# Patient Record
Sex: FEMALE | Race: WHITE | NOT HISPANIC OR LATINO | Employment: FULL TIME | URBAN - METROPOLITAN AREA
[De-identification: names, ages, dates, MRNs, and addresses within clinical notes are randomized per-mention and may not be internally consistent; named-entity substitution may affect disease eponyms.]

---

## 2024-04-27 ENCOUNTER — HOSPITAL ENCOUNTER (EMERGENCY)
Facility: HOSPITAL | Age: 44
Discharge: HOME | End: 2024-04-27
Attending: EMERGENCY MEDICINE
Payer: MEDICAID

## 2024-04-27 VITALS
TEMPERATURE: 99 F | RESPIRATION RATE: 14 BRPM | HEIGHT: 60 IN | BODY MASS INDEX: 42.6 KG/M2 | WEIGHT: 217 LBS | OXYGEN SATURATION: 96 % | HEART RATE: 82 BPM | SYSTOLIC BLOOD PRESSURE: 142 MMHG | DIASTOLIC BLOOD PRESSURE: 89 MMHG

## 2024-04-27 DIAGNOSIS — R51.9 NONINTRACTABLE HEADACHE, UNSPECIFIED CHRONICITY PATTERN, UNSPECIFIED HEADACHE TYPE: ICD-10-CM

## 2024-04-27 DIAGNOSIS — J04.0 LARYNGITIS: ICD-10-CM

## 2024-04-27 DIAGNOSIS — R49.0 HOARSE VOICE QUALITY: Primary | ICD-10-CM

## 2024-04-27 PROCEDURE — 99282 EMERGENCY DEPT VISIT SF MDM: CPT

## 2024-04-27 PROCEDURE — 96372 THER/PROPH/DIAG INJ SC/IM: CPT

## 2024-04-27 PROCEDURE — 25010000002 KETOROLAC TROMETHAMINE PER 15 MG: Performed by: EMERGENCY MEDICINE

## 2024-04-27 RX ORDER — KETOROLAC TROMETHAMINE 30 MG/ML
30 INJECTION, SOLUTION INTRAMUSCULAR; INTRAVENOUS ONCE
Status: COMPLETED | OUTPATIENT
Start: 2024-04-27 | End: 2024-04-27

## 2024-04-27 RX ADMIN — KETOROLAC TROMETHAMINE 30 MG: 30 INJECTION, SOLUTION INTRAMUSCULAR; INTRAVENOUS at 14:09

## 2024-04-27 NOTE — ED PROVIDER NOTES
Subjective   History of Present Illness  Patient is a pleasant 44-year-old female presents to the emergency department with headache and hoarse voice time.  She states that she have a history of recurrent laryngitis and is concerned that there could be something more concerning in the throat such as a polyp or cancer.  She states that for the past a year she has had frequent hoarse voice.  She had it for 3-month duration last year and was referred to ENT at that time.  Symptoms improved and she had commitments that she was unable to attend the ENT appointment in February.  Symptoms again had resolved for a couple months at that point.  Symptoms returned more recently in the hoarse voice currently persist.  She presents today with a desire to visualize/evaluate her vocal cords.  Additionally, she says that she has a mild headache.  She has a history of migraines but says that for the past 2 weeks she has been taking Excedrin Migraine throughout the day with only minimal relief.  Rates the headache as a mild to moderate.  States it is not a severe headache and the onset was not abrupt.        Review of Systems   All other systems reviewed and are negative.      No past medical history on file.    No Known Allergies    No past surgical history on file.    No family history on file.    Social History     Socioeconomic History    Marital status:            Objective   Physical Exam  Vitals and nursing note reviewed.   Constitutional:       General: She is not in acute distress.  HENT:      Head: Normocephalic and atraumatic.   Eyes:      Conjunctiva/sclera: Conjunctivae normal.      Pupils: Pupils are equal, round, and reactive to light.   Neck:      Thyroid: No thyromegaly.   Cardiovascular:      Rate and Rhythm: Normal rate and regular rhythm.      Heart sounds: Normal heart sounds. No murmur heard.     No friction rub. No gallop.   Pulmonary:      Effort: Pulmonary effort is normal. No respiratory distress.     "  Breath sounds: Normal breath sounds.   Abdominal:      General: Bowel sounds are normal.      Palpations: Abdomen is soft.      Tenderness: There is no abdominal tenderness.   Musculoskeletal:         General: Normal range of motion.      Cervical back: Normal range of motion and neck supple.   Lymphadenopathy:      Cervical: No cervical adenopathy.   Skin:     General: Skin is warm and dry.   Neurological:      Mental Status: She is alert and oriented to person, place, and time.   Psychiatric:         Behavior: Behavior normal.         Procedures           ED Course          No orders to display     Vitals:    04/27/24 1316 04/27/24 1339 04/27/24 1340 04/27/24 1400   BP: (!) 174/148 127/88 141/92 142/89   BP Location: Right arm Left arm     Patient Position: Sitting Lying     Pulse: 78 86 92 82   Resp: 18 14     Temp: 99 °F (37.2 °C)      TempSrc: Oral Oral     SpO2: 95%  98% 96%   Weight: 98.4 kg (217 lb) 98.4 kg (217 lb)     Height: 152.4 cm (60\") 152.4 cm (60\")       Medications   ketorolac (TORADOL) injection 30 mg (30 mg Intramuscular Given 4/27/24 1409)     ECG/EMG Results (last 24 hours)       ** No results found for the last 24 hours. **          No orders to display                                              Medical Decision Making  Problems Addressed:  Hoarse voice quality: complicated acute illness or injury  Laryngitis: complicated acute illness or injury  Nonintractable headache, unspecified chronicity pattern, unspecified headache type: complicated acute illness or injury    Amount and/or Complexity of Data Reviewed  External Data Reviewed: notes.    Risk  Prescription drug management.        Final diagnoses:   Hoarse voice quality   Laryngitis   Nonintractable headache, unspecified chronicity pattern, unspecified headache type       ED Disposition  ED Disposition       ED Disposition   Discharge    Condition   Stable    Comment   --               DISCHARGE    Patient discharged in stable " condition.    Reviewed implications of results, diagnosis, meds, responsibility to follow up, warning signs and symptoms of possible worsening, potential complications and reasons to return to ER.    Patient/Family voiced understanding of above instructions.    Discussed plan for discharge, as there is no emergent indication for admission.  Pt/family is agreeable and understands need for follow up and possible repeat testing.  Pt/family is aware that discharge does not mean that nothing is wrong but that it indicates no emergency is currently present that requires admission and they must continue care with follow-up as given below or with a physician of their choice.     FOLLOW-UP  Dwayne Blair MD  [address]  [address] [facility] [address]  [phone]    Schedule an appointment as soon as possible for a visit       [facility] EMERGENCY DEPARTMENT  [address] [facility] [address] [phone]    If symptoms worsen         Medication List      No changes were made to your prescriptions during this visit.            Mac White,   04/27/24 1827